# Patient Record
Sex: FEMALE | Race: WHITE | NOT HISPANIC OR LATINO | Employment: PART TIME | URBAN - METROPOLITAN AREA
[De-identification: names, ages, dates, MRNs, and addresses within clinical notes are randomized per-mention and may not be internally consistent; named-entity substitution may affect disease eponyms.]

---

## 2021-01-08 ENCOUNTER — APPOINTMENT (OUTPATIENT)
Dept: LAB | Facility: CLINIC | Age: 21
End: 2021-01-08
Payer: COMMERCIAL

## 2021-01-08 ENCOUNTER — OFFICE VISIT (OUTPATIENT)
Dept: INTERNAL MEDICINE CLINIC | Facility: CLINIC | Age: 21
End: 2021-01-08
Payer: COMMERCIAL

## 2021-01-08 VITALS
BODY MASS INDEX: 28.64 KG/M2 | TEMPERATURE: 97.7 F | HEART RATE: 72 BPM | SYSTOLIC BLOOD PRESSURE: 100 MMHG | DIASTOLIC BLOOD PRESSURE: 78 MMHG | HEIGHT: 68 IN | WEIGHT: 189 LBS

## 2021-01-08 DIAGNOSIS — Z11.4 SCREENING FOR HIV (HUMAN IMMUNODEFICIENCY VIRUS): ICD-10-CM

## 2021-01-08 DIAGNOSIS — F32.2 SEVERE MAJOR DEPRESSIVE DISORDER (HCC): ICD-10-CM

## 2021-01-08 DIAGNOSIS — F51.4 NIGHT TERRORS: ICD-10-CM

## 2021-01-08 DIAGNOSIS — Z00.00 ANNUAL PHYSICAL EXAM: Primary | ICD-10-CM

## 2021-01-08 PROCEDURE — 3725F SCREEN DEPRESSION PERFORMED: CPT | Performed by: FAMILY MEDICINE

## 2021-01-08 PROCEDURE — 36415 COLL VENOUS BLD VENIPUNCTURE: CPT

## 2021-01-08 PROCEDURE — 1036F TOBACCO NON-USER: CPT | Performed by: FAMILY MEDICINE

## 2021-01-08 PROCEDURE — 87389 HIV-1 AG W/HIV-1&-2 AB AG IA: CPT

## 2021-01-08 PROCEDURE — 3008F BODY MASS INDEX DOCD: CPT | Performed by: FAMILY MEDICINE

## 2021-01-08 PROCEDURE — 99385 PREV VISIT NEW AGE 18-39: CPT | Performed by: FAMILY MEDICINE

## 2021-01-08 RX ORDER — PRAZOSIN HYDROCHLORIDE 2 MG/1
2 CAPSULE ORAL
COMMUNITY
End: 2021-01-08 | Stop reason: SDUPTHER

## 2021-01-08 RX ORDER — BUPROPION HYDROCHLORIDE 150 MG/1
150 TABLET, EXTENDED RELEASE ORAL 2 TIMES DAILY
COMMUNITY
End: 2021-01-08 | Stop reason: SDUPTHER

## 2021-01-08 RX ORDER — PRAZOSIN HYDROCHLORIDE 2 MG/1
2 CAPSULE ORAL
Qty: 30 CAPSULE | Refills: 0 | Status: SHIPPED | OUTPATIENT
Start: 2021-01-08 | End: 2021-02-15 | Stop reason: SDUPTHER

## 2021-01-08 RX ORDER — SERTRALINE HYDROCHLORIDE 100 MG/1
100 TABLET, FILM COATED ORAL DAILY
Qty: 30 TABLET | Refills: 0 | Status: SHIPPED | OUTPATIENT
Start: 2021-01-08 | End: 2021-02-15 | Stop reason: SDUPTHER

## 2021-01-08 RX ORDER — BUPROPION HYDROCHLORIDE 150 MG/1
150 TABLET, EXTENDED RELEASE ORAL 2 TIMES DAILY
Qty: 60 TABLET | Refills: 1 | Status: SHIPPED | OUTPATIENT
Start: 2021-01-08 | End: 2021-03-24 | Stop reason: ALTCHOICE

## 2021-01-08 RX ORDER — SERTRALINE HYDROCHLORIDE 100 MG/1
100 TABLET, FILM COATED ORAL DAILY
COMMUNITY
End: 2021-01-08 | Stop reason: SDUPTHER

## 2021-01-08 NOTE — PATIENT INSTRUCTIONS

## 2021-01-08 NOTE — PROGRESS NOTES
ADULT ANNUAL [address]     NAME: Marcel Ann  AGE: 21 y o  SEX: female  : 2000     DATE: 2021     Assessment and Plan:     Problem List Items Addressed This Visit     None      Visit Diagnoses     Annual physical exam    -  Primary    Screening for HIV (human immunodeficiency virus)        Relevant Orders    HIV 1/2 Antigen/Antibody (4th Generation) w Reflex [facility]    Severe major depressive disorder (HCC)        Relevant Medications    buPROPion (Wellbutrin SR) 150 mg 12 hr tablet    sertraline (ZOLOFT) 100 mg tablet    Night terrors        Relevant Medications    prazosin (MINIPRESS) 2 mg capsule        Plan: refills provided  Pt to f/u with psych  Will call with HIV results  Immunizations and preventive care screenings were discussed with patient today  Appropriate education was printed on patient's after visit summary  Counseling:  Dental Health: discussed importance of regular tooth brushing, flossing, and dental visits  Sexual health: discussed sexually transmitted diseases, partner selection, use of condoms, avoidance of unintended pregnancy, and contraceptive alternatives  · Exercise: the importance of regular exercise/physical activity was discussed  Recommend exercise 3-5 times per week for at least 30 minutes  BMI Counseling: Body mass index is 28 74 kg/m²  The BMI is above normal  Nutrition recommendations include encouraging healthy choices of fruits and vegetables and decreasing fast food intake  Exercise recommendations include exercising 3-5 times per week  Return in about 1 year (around 2022) for Annual physical      Chief Complaint:     Chief Complaint   Patient presents with    Establish Care      History of Present Illness:     Adult Annual Physical   Patient here for a comprehensive physical exam  The patient reports needs refills   recently moved from [facility]  has established with psych but first appt  isn't for weeks   Diet and Physical Activity  · Diet/Nutrition: poor diet  · Exercise: no formal exercise  Depression Screening  PHQ-9 Depression Screening    PHQ-9:   Frequency of the following problems over the past two weeks:      Little interest or pleasure in doing things: 0 - not at all  Feeling down, depressed, or hopeless: 0 - not at all  PHQ-2 Score: 0       General Health  · Sleep: sleeps well  · Hearing: normal - bilateral   · Vision: no vision problems  · Dental: no dental visits for >1 year and does not brush teeth regularly  /GYN Health  · Last menstrual period: a month ago   · Contraceptive method: hormonal patch  · History of STDs?: no      Review of Systems:     Review of Systems   Past Medical History:     History reviewed  No pertinent past medical history  Past Surgical History:     History reviewed  No pertinent surgical history     Social History:     E-Cigarette/Vaping    E-Cigarette Use Never User      E-Cigarette/Vaping Substances    Nicotine No     THC No     CBD No     Flavoring No     Other No     Unknown No      Social History     Socioeconomic History    Marital status: Single     Spouse name: None    Number of children: None    Years of education: None    Highest education level: None   Occupational History    None   Social Needs    Financial resource strain: None    Food insecurity     Worry: None     Inability: None    Transportation needs     Medical: None     Non-medical: None   Tobacco Use    Smoking status: Never Smoker    Smokeless tobacco: Never Used   Substance and Sexual Activity    Alcohol use: None    Drug use: None    Sexual activity: None   Lifestyle    Physical activity     Days per week: None     Minutes per session: None    Stress: None   Relationships    Social connections     Talks on phone: None     Gets together: None     Attends Ep[employer] service: None     Active member of club or organization: None     Attends meetings of clubs or organizations: None     Relationship status: None    Intimate partner violence     Fear of current or ex partner: None     Emotionally abused: None     Physically abused: None     Forced sexual activity: None   Other Topics Concern    None   Social History Narrative    None      Family History:     History reviewed  No pertinent family history  Current Medications:     Current Outpatient Medications   Medication Sig Dispense Refill    buPROPion (Wellbutrin SR) 150 mg 12 hr tablet Take 1 tablet (150 mg total) by mouth 2 (two) times a day 60 tablet 1    prazosin (MINIPRESS) 2 mg capsule Take 1 capsule (2 mg total) by mouth daily at bedtime 30 capsule 0    sertraline (ZOLOFT) 100 mg tablet Take 1 tablet (100 mg total) by mouth daily 30 tablet 0     No current facility-administered medications for this visit  Allergies:     No Known Allergies   Physical Exam:     /78 (BP Location: Left arm, Patient Position: Sitting) Comment: f  Pulse 72   Temp 97 7 °F (36 5 °C) (Temporal)   Ht 5' 8" (1 727 m)   Wt 85 7 kg (189 lb)   BMI 28 74 kg/m²     Physical Exam  Constitutional:       Appearance: Normal appearance  HENT:      Head: Normocephalic  Right Ear: Tympanic membrane and external ear normal       Left Ear: Tympanic membrane and external ear normal    Eyes:      Conjunctiva/sclera: Conjunctivae normal       Pupils: Pupils are equal, round, and reactive to light  Cardiovascular:      Rate and Rhythm: Normal rate and regular rhythm  Heart sounds: No murmur  Pulmonary:      Effort: Pulmonary effort is normal       Breath sounds: Normal breath sounds  Abdominal:      General: There is no distension  Palpations: Abdomen is soft  Tenderness: There is no abdominal tenderness  Musculoskeletal:      Right lower leg: No edema  Left lower leg: No edema     Neurological:      Mental Status: She is alert and oriented to person, place, and time       Gait: Gait normal       Deep Tendon Reflexes: Reflexes normal    Psychiatric:         Mood and Affect: Mood normal          Behavior: Behavior normal           Peter Whiteside, [facility] [address]

## 2021-01-10 LAB — HIV 1+2 AB+HIV1 P24 AG SERPL QL IA: NORMAL

## 2021-01-11 ENCOUNTER — TELEPHONE (OUTPATIENT)
Dept: INTERNAL MEDICINE CLINIC | Facility: CLINIC | Age: 21
End: 2021-01-11

## 2021-01-11 NOTE — TELEPHONE ENCOUNTER
----- Message from Tanner Quick DO sent at 1/11/2021  8:59 AM EST -----  Regarding: results  Please notify pt that her HIV screening  is negative       ThanksGer

## 2021-01-13 ENCOUNTER — TELEPHONE (OUTPATIENT)
Dept: INTERNAL MEDICINE CLINIC | Facility: CLINIC | Age: 21
End: 2021-01-13

## 2021-01-18 DIAGNOSIS — Z30.45 ENCOUNTER FOR SURVEILLANCE OF TRANSDERMAL PATCH HORMONAL CONTRACEPTIVE DEVICE: ICD-10-CM

## 2021-01-18 DIAGNOSIS — Z30.45 ENCOUNTER FOR SURVEILLANCE OF TRANSDERMAL PATCH HORMONAL CONTRACEPTIVE DEVICE: Primary | ICD-10-CM

## 2021-02-04 ENCOUNTER — TELEMEDICINE (OUTPATIENT)
Dept: INTERNAL MEDICINE CLINIC | Facility: CLINIC | Age: 21
End: 2021-02-04
Payer: COMMERCIAL

## 2021-02-04 DIAGNOSIS — A08.4 VIRAL GASTROENTERITIS: Primary | ICD-10-CM

## 2021-02-04 PROCEDURE — 99214 OFFICE O/P EST MOD 30 MIN: CPT | Performed by: FAMILY MEDICINE

## 2021-02-04 RX ORDER — BUPROPION HYDROCHLORIDE 150 MG/1
TABLET ORAL
COMMUNITY
Start: 2021-01-03

## 2021-02-04 RX ORDER — BUPROPION HYDROCHLORIDE 150 MG/1
TABLET, EXTENDED RELEASE ORAL
COMMUNITY
Start: 2021-01-08 | End: 2021-03-10 | Stop reason: SDUPTHER

## 2021-02-04 NOTE — PROGRESS NOTES
Virtual Regular Visit      Assessment/Plan:    Problem List Items Addressed This Visit     None      Visit Diagnoses     Viral gastroenteritis    -  Primary         plan:  Symptoms less than 24 hours  Frequent diarrheal movements seems to be resolving  Patient afebrile  Low clinical suspicion for COVID-19 at this point  Would recommend patient stay home for the next few days and see if she has any additional symptoms presented self  Encouraged continued hydration and OTC antiemetics if needed  Work excuse provided  Reason for visit is   Chief Complaint   Patient presents with    sick visit     vomit and diarrhea since last night - wants a doctors note     Virtual Regular Visit        Encounter provider Carmela Peterson DO    Provider located at [address] [place] [address]      Recent Visits  No visits were found meeting these conditions  Showing recent visits within past 7 days and meeting all other requirements     Today's Visits  Date Type Provider Dept   02/04/21 Telemedicine [address] today's visits and meeting all other requirements     Future Appointments  No visits were found meeting these conditions  Showing future appointments within next 150 days and meeting all other requirements        The patient was identified by name and date of birth  Mily Casey was informed that this is a telemedicine visit and that the visit is being conducted through [address] [place] [address] and patient was informed that this is not a secure, HIPAA-compliant platform  She agrees to proceed     My office door was closed  No one else was in the room  She acknowledged consent and understanding of privacy and security of the video platform  The patient has agreed to participate and understands they can discontinue the visit at any time  Patient is aware this is a billable service  Subjective  Tammie Solano is a 24 y o  female    Patient reports episodes of nausea vomiting and diarrhea  Onset last night around 10:00 a m  she had an episode of loose watery stool  Says this persisted all night  She is waking up almost hourly to get up and have a bowel movement  Denies blood in the stool  Around 1:00 a m  she began to feel nauseous and subsequently vomited  vomitus was non bilious and without blood  Says the vomit contained her nighttime medications some of her dinner  Since then she continues to have loose bowel movements but has no repeat episodes of vomiting  This morning she has tolerated p o  liquids  Has not attempted to eat as of yet  Last bowel movement was 8:00 a m  today  Denies any new foods over the past 24 hours  Denies eating restaurant foods or fast foods with the past 24 hours  Patient denies any possible COVID-19 exposures  No in her household is symptomatic or being tested for COVID  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 or actively being investigated for COVID-19  She denies loss taste or smell  History reviewed  No pertinent past medical history  History reviewed  No pertinent surgical history  Current Outpatient Medications   Medication Sig Dispense Refill    buPROPion (Wellbutrin SR) 150 mg 12 hr tablet Take 1 tablet (150 mg total) by mouth 2 (two) times a day 60 tablet 1    buPROPion (WELLBUTRIN XL) 150 mg 24 hr tablet       buPROPion (ZYBAN) 150 MG 12 hr tablet       norelgestromin-ethinyl estradiol (ORTHO EVRA) 150-35 MCG/24HR Place 1 patch on the skin once a week 4 patch 3    prazosin (MINIPRESS) 2 mg capsule Take 1 capsule (2 mg total) by mouth daily at bedtime 30 capsule 0    sertraline (ZOLOFT) 100 mg tablet Take 1 tablet (100 mg total) by mouth daily 30 tablet 0     No current facility-administered medications for this visit           No Known Allergies    Review of Systems   Constitutional: Negative for fever  Respiratory: Negative for cough and shortness of breath  Cardiovascular: Negative for chest pain  Gastrointestinal: Positive for diarrhea, nausea and vomiting  Negative for abdominal pain and blood in stool  Musculoskeletal: Negative for myalgias  Neurological: Negative for headaches  Video Exam    There were no vitals filed for this visit  Physical Exam  Constitutional:       Appearance: She is not toxic-appearing  HENT:      Head: Normocephalic and atraumatic  Right Ear: External ear normal       Left Ear: External ear normal       Nose: Nose normal    Eyes:      Conjunctiva/sclera: Conjunctivae normal    Pulmonary:      Effort: Pulmonary effort is normal    Neurological:      Mental Status: She is alert and oriented to person, place, and time  Psychiatric:         Mood and Affect: Mood normal           I spent 10 minutes directly with the patient during this visit      [address] acknowledges that she has consented to an online visit or consultation  She understands that the online visit is based solely on information provided by her, and that, in the absence of a face-to-face physical evaluation by the physician, the diagnosis she receives is both limited and provisional in terms of accuracy and completeness  This is not intended to replace a full medical face-to-face evaluation by the physician  [place] Handler understands and accepts these terms

## 2021-02-04 NOTE — LETTER
February 4, 2021     Patient: Freya Lee   YOB: 2000   Date of Visit: 2/4/2021       To Whom it May Concern:    Freya Lee is under my professional care  She was seen in my office on 2/4/2021  She may return to work on 2/8/2021  Please excuse her for her absence of work on 2/4th and 5th  If you have any questions or concerns, please don't hesitate to call           Sincerely,          Roldan MARIN DO        CC: No Recipients

## 2021-02-15 ENCOUNTER — TELEPHONE (OUTPATIENT)
Dept: INTERNAL MEDICINE CLINIC | Facility: CLINIC | Age: 21
End: 2021-02-15

## 2021-02-15 DIAGNOSIS — Z30.45 ENCOUNTER FOR SURVEILLANCE OF TRANSDERMAL PATCH HORMONAL CONTRACEPTIVE DEVICE: ICD-10-CM

## 2021-02-15 DIAGNOSIS — F51.4 NIGHT TERRORS: ICD-10-CM

## 2021-02-15 DIAGNOSIS — F32.2 SEVERE MAJOR DEPRESSIVE DISORDER (HCC): ICD-10-CM

## 2021-02-15 RX ORDER — PRAZOSIN HYDROCHLORIDE 2 MG/1
2 CAPSULE ORAL
Qty: 30 CAPSULE | Refills: 3 | Status: SHIPPED | OUTPATIENT
Start: 2021-02-15 | End: 2021-03-10 | Stop reason: SDUPTHER

## 2021-02-15 RX ORDER — SERTRALINE HYDROCHLORIDE 100 MG/1
100 TABLET, FILM COATED ORAL DAILY
Qty: 30 TABLET | Refills: 3 | Status: SHIPPED | OUTPATIENT
Start: 2021-02-15 | End: 2021-03-10 | Stop reason: SDUPTHER

## 2021-02-15 NOTE — TELEPHONE ENCOUNTER
RX REFILLS:    XULANE PATCH BIRTH CONTROL    buPROPion (Wellbutrin SR) 150 mg 12 hr    prazosin (MINIPRESS) 2 mg capsule    sertraline (ZOLOFT) 100 mg tablet      NEW PHARMACY   [facility] [address]

## 2021-02-15 NOTE — TELEPHONE ENCOUNTER
[facility]  CALLED  QUESTIONS  ABOUT  THE  RX  NORELGESTROMIN   PATCH   DIRECTIONS  ON  PATCH   CALL  SHAHIDA  [address]   [id number]

## 2021-03-10 ENCOUNTER — TELEPHONE (OUTPATIENT)
Dept: INTERNAL MEDICINE CLINIC | Facility: CLINIC | Age: 21
End: 2021-03-10

## 2021-03-10 DIAGNOSIS — F32.2 SEVERE MAJOR DEPRESSIVE DISORDER (HCC): ICD-10-CM

## 2021-03-10 DIAGNOSIS — F51.4 NIGHT TERRORS: ICD-10-CM

## 2021-03-10 RX ORDER — BUPROPION HYDROCHLORIDE 150 MG/1
150 TABLET, EXTENDED RELEASE ORAL 2 TIMES DAILY
Qty: 10 TABLET | Refills: 0 | Status: SHIPPED | OUTPATIENT
Start: 2021-03-10 | End: 2021-03-24 | Stop reason: ALTCHOICE

## 2021-03-10 RX ORDER — SERTRALINE HYDROCHLORIDE 100 MG/1
100 TABLET, FILM COATED ORAL DAILY
Qty: 5 TABLET | Refills: 0 | Status: SHIPPED | OUTPATIENT
Start: 2021-03-10 | End: 2022-02-08

## 2021-03-10 RX ORDER — PRAZOSIN HYDROCHLORIDE 2 MG/1
2 CAPSULE ORAL
Qty: 5 CAPSULE | Refills: 0 | Status: SHIPPED | OUTPATIENT
Start: 2021-03-10 | End: 2021-03-24 | Stop reason: ALTCHOICE

## 2021-03-10 NOTE — TELEPHONE ENCOUNTER
Patient states she had to go to [place] for a family matter and left her medications here  She would like to know if it is possible to send a 5 day supply to a pharmacy in [facility]  She states she is scheduled to return on 3/14/21      She would need  sertraline (ZOLOFT) 100 mg tablet    prazosin (MINIPRESS) 2 mg capsule    buPROPion (Wellbutrin SR) 150 mg 12 hr tablet    Send to  [facility]  [address], [address]

## 2021-03-17 ENCOUNTER — TELEPHONE (OUTPATIENT)
Dept: INTERNAL MEDICINE CLINIC | Facility: CLINIC | Age: 21
End: 2021-03-17

## 2021-03-17 DIAGNOSIS — Z30.45 ENCOUNTER FOR SURVEILLANCE OF TRANSDERMAL PATCH HORMONAL CONTRACEPTIVE DEVICE: ICD-10-CM

## 2021-03-17 NOTE — TELEPHONE ENCOUNTER
Patient is requesting a refill for Xulane Patch      Send to [facility], [address]

## 2021-03-23 RX ORDER — TRAZODONE HYDROCHLORIDE 50 MG/1
TABLET ORAL
COMMUNITY
Start: 2021-03-14

## 2021-03-24 ENCOUNTER — OFFICE VISIT (OUTPATIENT)
Dept: OBGYN CLINIC | Age: 21
End: 2021-03-24
Payer: COMMERCIAL

## 2021-03-24 VITALS
HEIGHT: 67 IN | BODY MASS INDEX: 30.35 KG/M2 | SYSTOLIC BLOOD PRESSURE: 108 MMHG | WEIGHT: 193.4 LBS | DIASTOLIC BLOOD PRESSURE: 70 MMHG

## 2021-03-24 DIAGNOSIS — Z01.419 ENCOUNTER FOR GYNECOLOGICAL EXAMINATION WITHOUT ABNORMAL FINDING: Primary | ICD-10-CM

## 2021-03-24 DIAGNOSIS — Z30.45 ENCOUNTER FOR SURVEILLANCE OF TRANSDERMAL PATCH HORMONAL CONTRACEPTIVE DEVICE: ICD-10-CM

## 2021-03-24 PROCEDURE — 99385 PREV VISIT NEW AGE 18-39: CPT | Performed by: NURSE PRACTITIONER

## 2021-03-24 PROCEDURE — 3008F BODY MASS INDEX DOCD: CPT | Performed by: FAMILY MEDICINE

## 2021-03-24 PROCEDURE — G0145 SCR C/V CYTO,THINLAYER,RESCR: HCPCS | Performed by: NURSE PRACTITIONER

## 2021-03-24 NOTE — PATIENT INSTRUCTIONS
Pap Smear   GENERAL INFORMATION:   What is a Pap smear? A Pap smear, or Pap test, is a procedure to check your cervix for abnormal cells  The cervix is the narrow opening at the bottom of your uterus  The cervix meets the top part of the vagina  How do I prepare for a Pap smear? The best time to schedule the test is right after your period stops  Do not have a Pap smear during your monthly period  Do not have intercourse or put anything in your vagina for 24 hours before your test    What will happen during a Pap smear? · You will lie on your back and place your feet on footrests called stirrups  Your caregiver will gently insert a device called a speculum into your vagina  The speculum is used to spread the walls of your vagina so he can see your cervix  He will use a thin brush or cotton swab to collect cells from the inside of your cervix  · Your caregiver will also collect cells from the surface of your cervix with a plastic or wooden tool called a spatula  He may also gently scrape the upper part of your vagina for a sample  The samples are placed in a container with liquid or on a glass slide  They are sent to a lab and examined for abnormal cells  How often do I need a Pap smear? Pap smears are usually done every 1 to 3 years  You may need a Pap smear more often if you have any of the following:  · Positive test result for the human papillomavirus (HPV)    · Cervical intraepithelial neoplasm or cervical cancer    · HIV    · A weak immune system    · Exposure to diethylstilbestrol (YEFRI) medicine when your mother was pregnant with you  CARE AGREEMENT:   You have the right to help plan your care  Learn about your health condition and how it may be treated  Discuss treatment options with your caregivers to decide what care you want to receive  You always have the right to refuse treatment  The above information is an  only   It is not intended as medical advice for individual conditions or treatments  Talk to your doctor, nurse or pharmacist before following any medical regimen to see if it is safe and effective for you  © 2014 [address] is for End User's use only and may not be sold, redistributed or otherwise used for commercial purposes  All illustrations and images included in CareNotes® are the copyrighted property of A D A M , Inc  or Dustin Potts
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
## 2021-03-25 ENCOUNTER — TELEMEDICINE (OUTPATIENT)
Dept: INTERNAL MEDICINE CLINIC | Facility: CLINIC | Age: 21
End: 2021-03-25
Payer: COMMERCIAL

## 2021-03-25 DIAGNOSIS — B34.9 VIRAL INFECTION, UNSPECIFIED: Primary | ICD-10-CM

## 2021-03-25 PROCEDURE — 99213 OFFICE O/P EST LOW 20 MIN: CPT | Performed by: FAMILY MEDICINE

## 2021-03-30 LAB
LAB AP GYN PRIMARY INTERPRETATION: NORMAL
Lab: NORMAL
PATH INTERP SPEC-IMP: NORMAL

## 2021-03-31 ENCOUNTER — TELEMEDICINE (OUTPATIENT)
Dept: INTERNAL MEDICINE CLINIC | Facility: CLINIC | Age: 21
End: 2021-03-31
Payer: COMMERCIAL

## 2021-03-31 DIAGNOSIS — U07.1 COVID-19 VIRUS INFECTION: Primary | ICD-10-CM

## 2021-03-31 PROCEDURE — 1036F TOBACCO NON-USER: CPT | Performed by: FAMILY MEDICINE

## 2021-03-31 PROCEDURE — 99213 OFFICE O/P EST LOW 20 MIN: CPT | Performed by: FAMILY MEDICINE

## 2021-03-31 NOTE — LETTER
March 31, 2021     Patient: Emilee Coleman   YOB: 2000   Date of Visit: 3/31/2021       To Whom it May Concern:    Emilee Coleman is under my professional care  She was seen in my office on 3/31/2021  She may return to school on 4/1/2021  Please excuse her absence on 3/30/2021    If you have any questions or concerns, please don't hesitate to call           Sincerely,          Krystal MARIN DO        CC: No Recipients
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
## 2021-04-01 ENCOUNTER — TELEPHONE (OUTPATIENT)
Dept: INTERNAL MEDICINE CLINIC | Facility: CLINIC | Age: 21
End: 2021-04-01

## 2021-04-01 NOTE — LETTER
April 1, 2021     Patient: Maddison Mireles   YOB: 2000   Date of Visit: 4/1/2021       To Whom it May Concern:    Maddison Mierles is under my professional care  She was seen in my office on 3/31/21  She may return to school on 4/1/2021  Please excuse her absence on 3/30/2021    If you have any questions or concerns, please don't hesitate to call           Sincerely,          Ani Giles        CC: No Recipients

## 2021-04-01 NOTE — TELEPHONE ENCOUNTER
Spoke with dr Caryl Bower yesterday, her school letter is not on my chart    Patient is requesting if we can try and resubmit it again through my chart     # [phone]

## 2021-04-09 DIAGNOSIS — Z23 ENCOUNTER FOR IMMUNIZATION: ICD-10-CM

## 2021-04-14 ENCOUNTER — TRANSCRIBE ORDERS (OUTPATIENT)
Dept: LAB | Facility: CLINIC | Age: 21
End: 2021-04-14

## 2021-04-14 ENCOUNTER — APPOINTMENT (OUTPATIENT)
Dept: LAB | Facility: CLINIC | Age: 21
End: 2021-04-14
Payer: COMMERCIAL

## 2021-04-14 ENCOUNTER — OFFICE VISIT (OUTPATIENT)
Dept: INTERNAL MEDICINE CLINIC | Facility: CLINIC | Age: 21
End: 2021-04-14
Payer: COMMERCIAL

## 2021-04-14 VITALS
OXYGEN SATURATION: 96 % | DIASTOLIC BLOOD PRESSURE: 70 MMHG | TEMPERATURE: 96.5 F | BODY MASS INDEX: 29.66 KG/M2 | SYSTOLIC BLOOD PRESSURE: 100 MMHG | HEIGHT: 67 IN | WEIGHT: 189 LBS | HEART RATE: 78 BPM

## 2021-04-14 DIAGNOSIS — Z79.899 ENCOUNTER FOR LONG-TERM (CURRENT) USE OF OTHER MEDICATIONS: ICD-10-CM

## 2021-04-14 DIAGNOSIS — F41.1 GAD (GENERALIZED ANXIETY DISORDER): Primary | ICD-10-CM

## 2021-04-14 DIAGNOSIS — Z79.899 ENCOUNTER FOR LONG-TERM (CURRENT) USE OF OTHER MEDICATIONS: Primary | ICD-10-CM

## 2021-04-14 DIAGNOSIS — F43.22 ADJUSTMENT DISORDER WITH ANXIETY: ICD-10-CM

## 2021-04-14 LAB
ALBUMIN SERPL BCP-MCNC: 3.7 G/DL (ref 3.5–5)
ALP SERPL-CCNC: 51 U/L (ref 46–116)
ALT SERPL W P-5'-P-CCNC: 23 U/L (ref 12–78)
AMPHETAMINES SERPL QL SCN: NEGATIVE
ANION GAP SERPL CALCULATED.3IONS-SCNC: 5 MMOL/L (ref 4–13)
AST SERPL W P-5'-P-CCNC: 16 U/L (ref 5–45)
BARBITURATES UR QL: NEGATIVE
BENZODIAZ UR QL: NEGATIVE
BILIRUB SERPL-MCNC: 0.34 MG/DL (ref 0.2–1)
BUN SERPL-MCNC: 8 MG/DL (ref 5–25)
CALCIUM SERPL-MCNC: 9.5 MG/DL (ref 8.3–10.1)
CHLORIDE SERPL-SCNC: 106 MMOL/L (ref 100–108)
CHOLEST SERPL-MCNC: 173 MG/DL (ref 50–200)
CO2 SERPL-SCNC: 27 MMOL/L (ref 21–32)
COCAINE UR QL: NEGATIVE
CREAT SERPL-MCNC: 0.71 MG/DL (ref 0.6–1.3)
ERYTHROCYTE [DISTWIDTH] IN BLOOD BY AUTOMATED COUNT: 12.3 % (ref 11.6–15.1)
GFR SERPL CREATININE-BSD FRML MDRD: 122 ML/MIN/1.73SQ M
GLUCOSE P FAST SERPL-MCNC: 85 MG/DL (ref 65–99)
HCT VFR BLD AUTO: 44 % (ref 34.8–46.1)
HDLC SERPL-MCNC: 70 MG/DL
HGB BLD-MCNC: 14.3 G/DL (ref 11.5–15.4)
LDLC SERPL CALC-MCNC: 81 MG/DL (ref 0–100)
MCH RBC QN AUTO: 28.8 PG (ref 26.8–34.3)
MCHC RBC AUTO-ENTMCNC: 32.5 G/DL (ref 31.4–37.4)
MCV RBC AUTO: 89 FL (ref 82–98)
METHADONE UR QL: NEGATIVE
NONHDLC SERPL-MCNC: 103 MG/DL
OPIATES UR QL SCN: NEGATIVE
OXYCODONE+OXYMORPHONE UR QL SCN: NEGATIVE
PCP UR QL: NEGATIVE
PLATELET # BLD AUTO: 260 THOUSANDS/UL (ref 149–390)
PMV BLD AUTO: 11.7 FL (ref 8.9–12.7)
POTASSIUM SERPL-SCNC: 4.5 MMOL/L (ref 3.5–5.3)
PROT SERPL-MCNC: 7.1 G/DL (ref 6.4–8.2)
RBC # BLD AUTO: 4.97 MILLION/UL (ref 3.81–5.12)
SODIUM SERPL-SCNC: 138 MMOL/L (ref 136–145)
THC UR QL: NEGATIVE
TRIGL SERPL-MCNC: 110 MG/DL
TSH SERPL DL<=0.05 MIU/L-ACNC: 1 UIU/ML (ref 0.36–3.74)
WBC # BLD AUTO: 5.94 THOUSAND/UL (ref 4.31–10.16)

## 2021-04-14 PROCEDURE — 80053 COMPREHEN METABOLIC PANEL: CPT

## 2021-04-14 PROCEDURE — 84443 ASSAY THYROID STIM HORMONE: CPT

## 2021-04-14 PROCEDURE — 3008F BODY MASS INDEX DOCD: CPT | Performed by: FAMILY MEDICINE

## 2021-04-14 PROCEDURE — 99213 OFFICE O/P EST LOW 20 MIN: CPT | Performed by: FAMILY MEDICINE

## 2021-04-14 PROCEDURE — 80061 LIPID PANEL: CPT

## 2021-04-14 PROCEDURE — 36415 COLL VENOUS BLD VENIPUNCTURE: CPT

## 2021-04-14 PROCEDURE — 85027 COMPLETE CBC AUTOMATED: CPT

## 2021-04-14 PROCEDURE — 1036F TOBACCO NON-USER: CPT | Performed by: FAMILY MEDICINE

## 2021-04-14 PROCEDURE — 93000 ELECTROCARDIOGRAM COMPLETE: CPT | Performed by: FAMILY MEDICINE

## 2021-04-14 PROCEDURE — 80307 DRUG TEST PRSMV CHEM ANLYZR: CPT | Performed by: NURSE PRACTITIONER

## 2021-04-14 NOTE — PROGRESS NOTES
FOLLOW-UP OFFICE VISIT  [facility] - [facility]    NAME: Aurelio Velásquez  AGE: 24 y o  SEX: female  : 2000     DATE: 2021     Assessment and Plan:     Problem List Items Addressed This Visit     None      Visit Diagnoses     DAREN (generalized anxiety disorder)    -  Primary    Relevant Orders    POCT ECG              Plan: ECG normal sinus  Will fax to psychiatric provider and update chart  Return if symptoms worsen or fail to improve  Chief Complaint:     Chief Complaint   Patient presents with    ekg        History of Present Illness:     30-year-old past medical history significant for paternal anxiety disorder presents requesting EKG  Request on the have a psychiatry  Patient historically on Minip[employer] however was having palpitations while on the medications without discontinued  EKG requested for evaluation  Review of Systems:     Review of Systems   Constitutional: Negative for fever  Cardiovascular: Negative for chest pain and palpitations  Problem List:     Patient Active Problem List   Diagnosis    Encounter for gynecological examination without abnormal finding    Encounter for surveillance of transdermal patch hormonal contraceptive device    COVID-19 virus infection        Objective:     /70 (BP Location: Left arm, Patient Position: Sitting) Comment: gdfgd  Pulse 78   Temp (!) 96 5 °F (35 8 °C) (Tympanic) Comment: gdfgd  Ht 5' 7" (1 702 m)   Wt 85 7 kg (189 lb)   SpO2 96%   BMI 29 60 kg/m²     Physical Exam  Constitutional:       Appearance: She is not toxic-appearing  HENT:      Head: Normocephalic and atraumatic  Right Ear: External ear normal       Left Ear: External ear normal       Nose: Nose normal    Eyes:      Conjunctiva/sclera: Conjunctivae normal    Cardiovascular:      Rate and Rhythm: Normal rate     Pulmonary:      Effort: Pulmonary effort is normal    Neurological:      Mental Status: She is alert and oriented to person, place, and time  Psychiatric:         Mood and Affect: Mood normal          Behavior: Behavior normal          Thought Content:  Thought content normal            Lucretia Munoz Bussing: [facility]  4/14/2021 2:10 PM

## 2021-05-17 ENCOUNTER — IMMUNIZATIONS (OUTPATIENT)
Dept: FAMILY MEDICINE CLINIC | Facility: HOSPITAL | Age: 21
End: 2021-05-17

## 2021-05-17 DIAGNOSIS — Z23 ENCOUNTER FOR IMMUNIZATION: Primary | ICD-10-CM

## 2021-05-17 PROCEDURE — 91300 SARS-COV-2 / COVID-19 MRNA VACCINE (PFIZER-BIONTECH) 30 MCG: CPT

## 2021-05-17 PROCEDURE — 0001A SARS-COV-2 / COVID-19 MRNA VACCINE (PFIZER-BIONTECH) 30 MCG: CPT

## 2021-06-11 ENCOUNTER — IMMUNIZATIONS (OUTPATIENT)
Dept: FAMILY MEDICINE CLINIC | Facility: HOSPITAL | Age: 21
End: 2021-06-11

## 2021-06-11 DIAGNOSIS — Z23 ENCOUNTER FOR IMMUNIZATION: Primary | ICD-10-CM

## 2021-06-11 PROCEDURE — 0002A SARS-COV-2 / COVID-19 MRNA VACCINE (PFIZER-BIONTECH) 30 MCG: CPT

## 2021-06-11 PROCEDURE — 91300 SARS-COV-2 / COVID-19 MRNA VACCINE (PFIZER-BIONTECH) 30 MCG: CPT

## 2021-06-14 ENCOUNTER — OFFICE VISIT (OUTPATIENT)
Dept: INTERNAL MEDICINE CLINIC | Facility: CLINIC | Age: 21
End: 2021-06-14
Payer: COMMERCIAL

## 2021-06-14 VITALS
HEART RATE: 91 BPM | BODY MASS INDEX: 30.76 KG/M2 | WEIGHT: 196 LBS | HEIGHT: 67 IN | TEMPERATURE: 97.9 F | SYSTOLIC BLOOD PRESSURE: 118 MMHG | DIASTOLIC BLOOD PRESSURE: 84 MMHG | OXYGEN SATURATION: 99 %

## 2021-06-14 DIAGNOSIS — T50.Z95A ADVERSE REACTION TO VACCINE, INITIAL ENCOUNTER: ICD-10-CM

## 2021-06-14 DIAGNOSIS — M54.2 NECK PAIN: Primary | ICD-10-CM

## 2021-06-14 PROCEDURE — 3008F BODY MASS INDEX DOCD: CPT | Performed by: FAMILY MEDICINE

## 2021-06-14 PROCEDURE — 1036F TOBACCO NON-USER: CPT | Performed by: FAMILY MEDICINE

## 2021-06-14 PROCEDURE — 99214 OFFICE O/P EST MOD 30 MIN: CPT | Performed by: FAMILY MEDICINE

## 2021-06-14 RX ORDER — CYCLOBENZAPRINE HCL 10 MG
10 TABLET ORAL 2 TIMES DAILY PRN
Qty: 14 TABLET | Refills: 0 | Status: SHIPPED | OUTPATIENT
Start: 2021-06-14 | End: 2022-05-06

## 2021-06-14 NOTE — PROGRESS NOTES
FOLLOW-UP OFFICE VISIT  [facility] - [facility]    NAME: Sai Cao  AGE: 24 y o  SEX: female  : 2000     DATE: 2021     Assessment and Plan:     Problem List Items Addressed This Visit     None      Visit Diagnoses     Neck pain    -  Primary    Relevant Medications    cyclobenzaprine (FLEXERIL) 10 mg tablet    Adverse reaction to vaccine, initial encounter          Plan; myofacial pain  Less concern for meningitis or thrombus  likely related to recent vaccination  Will continue with conservative treatment  Will add muscle relaxer  Pt to continue tylenol and warm compress  Return if symptoms worsen or fail to improve  Chief Complaint:     Chief Complaint   Patient presents with    neck stiffness after covid shot     since friday         History of Present Illness:       70-year-old female presents for neck stiffness and pain  Onset Friday a few hours   After receiving the 2nd 5 user COVID-19 vaccine  Quality pain is stiff and achy     Occasional radiation of a sharp pain down into the upper shoulders with rotation other to left or right  Has tried ibuprofen,  Acetaminophen, heat and cold on the area with no significant improvement  Associated with slight lightheadedness  Without dizziness or syncope  Denies fever, chills, nausea, vomiting or visual changes  Today her pain is a 7/10         Review of Systems:     Per HPI      Problem List:     Patient Active Problem List   Diagnosis    Encounter for gynecological examination without abnormal finding    Encounter for surveillance of transdermal patch hormonal contraceptive device    COVID-19 virus infection        Objective:     /84 (BP Location: Left arm, Patient Position: Sitting) Comment: gd  Pulse 91   Temp 97 9 °F (36 6 °C) (Tympanic) Comment: gd  Ht 5' 7" (1 702 m)   Wt 88 9 kg (196 lb)   SpO2 99%   BMI 30 70 kg/m²     Physical Exam  Constitutional:       Appearance: She is not ill-appearing  HENT:      Head: Normocephalic and atraumatic  Right Ear: External ear normal       Left Ear: External ear normal    Eyes:      Conjunctiva/sclera: Conjunctivae normal    Neck:      Meningeal: Kernig's sign absent  Cardiovascular:      Rate and Rhythm: Normal rate  Pulmonary:      Effort: Pulmonary effort is normal    Musculoskeletal:      Cervical back: Normal range of motion and neck supple  No rigidity  Muscular tenderness present  No spinous process tenderness  Lymphadenopathy:      Cervical: No cervical adenopathy  Skin:     Capillary Refill: Capillary refill takes less than 2 seconds  Neurological:      Mental Status: She is alert and oriented to person, place, and time  Gait: Gait normal    Psychiatric:         Mood and Affect: Mood normal          Behavior: Behavior normal          Thought Content:  Thought content normal          Pertinent Laboratory/Diagnostic Studies:    Laboratory Results: I have personally reviewed the pertinent laboratory results/reports           Lisa AMBROSE [facility]: [facility]  6/14/2021 10:43 AM

## 2021-06-14 NOTE — LETTER
June 14, 2021     Patient: Deon Dumont   YOB: 2000   Date of Visit: 6/14/2021       To Whom it May Concern:    Deon Dumont is under my professional care  She was seen in my office on 6/14/2021  She may return to work on 6/16/2021  If you have any questions or concerns, please don't hesitate to call           Sincerely,          Jennifer Foley, DO

## 2021-08-27 ENCOUNTER — TELEPHONE (OUTPATIENT)
Dept: INTERNAL MEDICINE CLINIC | Facility: CLINIC | Age: 21
End: 2021-08-27

## 2021-09-09 ENCOUNTER — VBI (OUTPATIENT)
Dept: ADMINISTRATIVE | Facility: OTHER | Age: 21
End: 2021-09-09

## 2021-10-29 ENCOUNTER — TELEPHONE (OUTPATIENT)
Dept: INTERNAL MEDICINE CLINIC | Facility: CLINIC | Age: 21
End: 2021-10-29

## 2022-01-20 ENCOUNTER — NURSE TRIAGE (OUTPATIENT)
Dept: OTHER | Facility: OTHER | Age: 22
End: 2022-01-20

## 2022-01-20 ENCOUNTER — TELEMEDICINE (OUTPATIENT)
Dept: INTERNAL MEDICINE CLINIC | Facility: CLINIC | Age: 22
End: 2022-01-20
Payer: COMMERCIAL

## 2022-01-20 DIAGNOSIS — J01.90 ACUTE NON-RECURRENT SINUSITIS, UNSPECIFIED LOCATION: Primary | ICD-10-CM

## 2022-01-20 PROCEDURE — 99213 OFFICE O/P EST LOW 20 MIN: CPT | Performed by: NURSE PRACTITIONER

## 2022-01-20 PROCEDURE — 1036F TOBACCO NON-USER: CPT | Performed by: NURSE PRACTITIONER

## 2022-01-20 RX ORDER — AMOXICILLIN AND CLAVULANATE POTASSIUM 875; 125 MG/1; MG/1
1 TABLET, FILM COATED ORAL EVERY 12 HOURS SCHEDULED
Qty: 20 TABLET | Refills: 0 | Status: SHIPPED | OUTPATIENT
Start: 2022-01-20 | End: 2022-01-30

## 2022-01-20 RX ORDER — METFORMIN HYDROCHLORIDE 500 MG/1
TABLET, EXTENDED RELEASE ORAL
COMMUNITY
Start: 2021-11-16 | End: 2022-02-08

## 2022-01-20 RX ORDER — PHENTERMINE HYDROCHLORIDE 37.5 MG/1
TABLET ORAL
COMMUNITY
Start: 2021-11-22 | End: 2022-02-08

## 2022-01-20 NOTE — TELEPHONE ENCOUNTER
Regarding: cough, head, nasal and chest congestion, sore throat  ----- Message from [employer] sent at 1/20/2022  7:08 AM EST -----  "I have a cough, head, nasal and chest congestion and a sore throat; my home Covid test was negative "

## 2022-01-20 NOTE — TELEPHONE ENCOUNTER
Patient has a sore throat that was swollen a couple of days ago, a cough, head/nasal/chest congestion  She had a negative COVID-19 test at a local pharmacy and would like to be seen in the office if possible because of her throat pain  Virtual made for 0940 this morning  Please call or text patient instructions or if she can be seen in person instead

## 2022-01-20 NOTE — PATIENT INSTRUCTIONS
Tonsillitis   AMBULATORY CARE:   Tonsillitis  is inflammation of your tonsils  Tonsils are the lumps of tissue on both sides of the back of your throat  Tonsils are part of your immune system  They help you fight infections  Tonsillitis is usually caused by bacteria or a virus  Recurrent tonsillitis is when you have tonsillitis many times in 1 year  Chronic tonsillitis is when you have a sore throat that lasts 3 months or longer  Common symptoms include the following:   · Severe sore throat    · Red, swollen tonsils    · Painful swallowing    · Fever and chills    · Bad breath    · White spots on the tonsils    Call 911 for the following:   · Trouble breathing because your tonsils are swollen      Treatment for tonsillitis  may include any of the following:  · Acetaminophen  decreases pain and fever  It is available without a doctor's order  Ask how much to take and how often to take it  Follow directions  Acetaminophen can cause liver damage if not taken correctly  · NSAIDs , such as ibuprofen, help decrease swelling, pain, and fever  This medicine is available with or without a doctor's order  NSAIDs can cause stomach bleeding or kidney problems in certain people  If you take blood thinner medicine, always ask your healthcare provider if NSAIDs are safe for you  Always read the medicine label and follow directions  · Antibiotics  help treat a bacterial infection  · A tonsillectomy  is surgery to remove your tonsils  You may need surgery if you have chronic or recurrent tonsillitis  Surgery is also done if antibiotics are not getting rid of your tonsillitis  Rest  when you feel it is needed  Slowly start to do more each day  Return to your daily activities as directed  Drink liquids as directed: You may need to drink more liquid than usual to help prevent dehydration  Ask how much liquid to drink each day and which liquids are best for you  Gargle with warm salt water:   This may help decrease throat pain  Mix 1 teaspoon of salt in 8 ounces of warm water  Ask how often you should do this  Follow up with your doctor as directed:  Write down your questions so you remember to ask them during your visits  © Copyright [employer] 2021 Information is for End User's use only and may not be sold, redistributed or otherwise used for commercial purposes  All illustrations and images included in CareNotes® are the copyrighted property of [employer]  or [employer]  The above information is an  only  It is not intended as medical advice for individual conditions or treatments  Talk to your doctor, nurse or pharmacist before following any medical regimen to see if it is safe and effective for you

## 2022-01-20 NOTE — LETTER
January 20, 2022     Patient: Gustavo Ortega   YOB: 2000   Date of Visit: 1/20/2022       To Whom it May Concern:    Gustavo Ortega is under my professional care  She was seen on 1/20/2022  She may return to work on 1/24/2022  Please excuse her from work/school 1/20/2022-1/23/2022  If you have any questions or concerns, please don't hesitate to call           Sincerely,          MAIDA Uribe        CC: Gustavo Ortega
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
## 2022-02-08 ENCOUNTER — OFFICE VISIT (OUTPATIENT)
Dept: INTERNAL MEDICINE CLINIC | Facility: CLINIC | Age: 22
End: 2022-02-08
Payer: COMMERCIAL

## 2022-02-08 ENCOUNTER — APPOINTMENT (OUTPATIENT)
Dept: LAB | Facility: CLINIC | Age: 22
End: 2022-02-08
Payer: COMMERCIAL

## 2022-02-08 ENCOUNTER — TELEPHONE (OUTPATIENT)
Dept: INTERNAL MEDICINE CLINIC | Facility: CLINIC | Age: 22
End: 2022-02-08

## 2022-02-08 VITALS
TEMPERATURE: 97.9 F | SYSTOLIC BLOOD PRESSURE: 112 MMHG | WEIGHT: 194 LBS | HEIGHT: 67 IN | DIASTOLIC BLOOD PRESSURE: 70 MMHG | BODY MASS INDEX: 30.45 KG/M2

## 2022-02-08 DIAGNOSIS — R30.0 DYSURIA: ICD-10-CM

## 2022-02-08 DIAGNOSIS — B37.3 VAGINAL CANDIDIASIS: Primary | ICD-10-CM

## 2022-02-08 LAB
BACTERIA UR QL AUTO: NORMAL /HPF
BILIRUB UR QL STRIP: NEGATIVE
CLARITY UR: CLEAR
COLOR UR: YELLOW
GLUCOSE UR STRIP-MCNC: NEGATIVE MG/DL
HGB UR QL STRIP.AUTO: NEGATIVE
HYALINE CASTS #/AREA URNS LPF: NORMAL /LPF
KETONES UR STRIP-MCNC: NEGATIVE MG/DL
LEUKOCYTE ESTERASE UR QL STRIP: NEGATIVE
NITRITE UR QL STRIP: NEGATIVE
NON-SQ EPI CELLS URNS QL MICRO: NORMAL /HPF
PH UR STRIP.AUTO: 8.5 [PH]
PROT UR STRIP-MCNC: ABNORMAL MG/DL
RBC #/AREA URNS AUTO: NORMAL /HPF
SP GR UR STRIP.AUTO: 1.02 (ref 1–1.03)
UROBILINOGEN UR QL STRIP.AUTO: 1 E.U./DL
WBC #/AREA URNS AUTO: NORMAL /HPF

## 2022-02-08 PROCEDURE — 3725F SCREEN DEPRESSION PERFORMED: CPT | Performed by: FAMILY MEDICINE

## 2022-02-08 PROCEDURE — 81001 URINALYSIS AUTO W/SCOPE: CPT

## 2022-02-08 PROCEDURE — 3008F BODY MASS INDEX DOCD: CPT | Performed by: FAMILY MEDICINE

## 2022-02-08 PROCEDURE — 1036F TOBACCO NON-USER: CPT | Performed by: FAMILY MEDICINE

## 2022-02-08 PROCEDURE — 99213 OFFICE O/P EST LOW 20 MIN: CPT | Performed by: FAMILY MEDICINE

## 2022-02-08 RX ORDER — FLUCONAZOLE 150 MG/1
TABLET ORAL
Qty: 2 TABLET | Refills: 0 | Status: SHIPPED | OUTPATIENT
Start: 2022-02-08 | End: 2022-02-10

## 2022-02-08 NOTE — LETTER
February 8, 2022     Patient: Izzy Mixon   YOB: 2000   Date of Visit: 2/8/2022       To Whom it May Concern:    Izzy Mixon is under my professional care  She was seen in my office on 2/8/2022  She may return to school on 2/9/2022  If you have any questions or concerns, please don't hesitate to call           Sincerely,          Ezio MARIN DO        CC: No Recipients

## 2022-02-08 NOTE — PROGRESS NOTES
FOLLOW-UP OFFICE VISIT  [facility] - [facility]    NAME: Aleja Urban  AGE: 25 y o  SEX: female  : 2000     DATE: 2022     Assessment and Plan:     Problem List Items Addressed This Visit     None      Visit Diagnoses     Vaginal candidiasis    -  Primary    Relevant Medications    fluconazole (DIFLUCAN) 150 mg tablet    Dysuria        Relevant Orders    UA w Reflex to Microscopic w Reflex to Culture -Lab Collect        Probably vaginal yeast infection  Last ecg 2021 showed normal QTC  No cardiovascular hx  Will give diflucan x 2 doses  UA to r/o urinary infection as well  Return in about 2 months (around 2022) for Annual physical      Chief Complaint:     Chief Complaint   Patient presents with    fu        History of Present Illness:     Pt c/o vaginal irritation and dysuria  X 2 weeks  Onset of symptoms was while taking abx for sinustitus  Hasn't improved despite completing abx  Tried vagisil wipes but that make her irritation worse  Review of Systems:     Review of Systems   Constitutional: Negative for fever  Genitourinary: Positive for dysuria, vaginal discharge (thick, white ) and vaginal pain (irritation )  Negative for hematuria  Problem List:     Patient Active Problem List   Diagnosis    Encounter for gynecological examination without abnormal finding    Encounter for surveillance of transdermal patch hormonal contraceptive device    COVID-19 virus infection        Objective:     /70 (BP Location: Left arm, Patient Position: Sitting)   Temp 97 9 °F (36 6 °C) (Tympanic)   Ht 5' 7" (1 702 m)   Wt 88 kg (194 lb)   BMI 30 38 kg/m²     Physical Exam  HENT:      Head: Normocephalic and atraumatic  Cardiovascular:      Rate and Rhythm: Normal rate  Pulmonary:      Effort: Pulmonary effort is normal    Neurological:      Mental Status: She is alert and oriented to person, place, and time        Gait: Gait normal          Pertinent Laboratory/Diagnostic Studies:      Radiology/Other Diagnostic Testing Results: I have personally reviewed pertinent reports          Tamra VEGASMid-Valley HospitalTLShad Kaz [facility]  2/8/2022 12:01 PM

## 2022-02-08 NOTE — TELEPHONE ENCOUNTER
----- Message from Ambreen BioDO sent at 2/8/2022  3:18 PM EST -----  Please notify pt that her urine studies do not suggest a urinary tract infection  She doesn't need the antibiotic   She does need to take the antifungal for yeast

## 2022-02-08 NOTE — TELEPHONE ENCOUNTER
Would like Dr Martine Jackson to call her about the urine sample lab results- she can see them, but needs a further explanation

## 2022-02-09 NOTE — TELEPHONE ENCOUNTER
Pt called to verify message- No UTI, No Antibiotics, take antifungal for yeast     Pt is having some back pain along spine and is very tired  - Doesn't feel herself  Could this from the yeast infection? Please call to advise  - preferably around 3:45p m  -   Advise that Dr Elsa Whiteside would try but she has appts all day and to specify a time is difficult   - She may received a message on her v/m    [phone]

## 2022-02-16 ENCOUNTER — TELEPHONE (OUTPATIENT)
Dept: INTERNAL MEDICINE CLINIC | Facility: CLINIC | Age: 22
End: 2022-02-16

## 2022-02-16 DIAGNOSIS — J32.9 RECURRENT SINUSITIS: Primary | ICD-10-CM

## 2022-02-16 NOTE — TELEPHONE ENCOUNTER
PT called back with the name of ENT that is in her network  Please change referral name from Dr Luis Miguel Solorzano ( not in pts network) to Dr Esthela Christianson    Please fax  referral to Dr Lynn Null # [phone]

## 2022-05-06 ENCOUNTER — OFFICE VISIT (OUTPATIENT)
Dept: INTERNAL MEDICINE CLINIC | Facility: CLINIC | Age: 22
End: 2022-05-06
Payer: COMMERCIAL

## 2022-05-06 VITALS
HEART RATE: 82 BPM | SYSTOLIC BLOOD PRESSURE: 102 MMHG | BODY MASS INDEX: 31.52 KG/M2 | TEMPERATURE: 98.9 F | HEIGHT: 67 IN | DIASTOLIC BLOOD PRESSURE: 66 MMHG | WEIGHT: 200.8 LBS | OXYGEN SATURATION: 98 %

## 2022-05-06 DIAGNOSIS — B00.1 COLD SORE: Primary | ICD-10-CM

## 2022-05-06 DIAGNOSIS — Z01.419 ENCOUNTER FOR ANNUAL ROUTINE GYNECOLOGICAL EXAMINATION: ICD-10-CM

## 2022-05-06 DIAGNOSIS — Z12.39 SCREENING BREAST EXAMINATION: ICD-10-CM

## 2022-05-06 PROCEDURE — 1036F TOBACCO NON-USER: CPT

## 2022-05-06 PROCEDURE — 99213 OFFICE O/P EST LOW 20 MIN: CPT

## 2022-05-06 PROCEDURE — 3008F BODY MASS INDEX DOCD: CPT

## 2022-05-06 PROCEDURE — 3725F SCREEN DEPRESSION PERFORMED: CPT

## 2022-05-06 RX ORDER — VALACYCLOVIR HYDROCHLORIDE 1 G/1
1000 TABLET, FILM COATED ORAL AS NEEDED
Qty: 4 TABLET | Refills: 5 | Status: SHIPPED | OUTPATIENT
Start: 2022-05-06 | End: 2022-05-07

## 2022-05-06 RX ORDER — ACYCLOVIR 50 MG/G
OINTMENT TOPICAL 4 TIMES DAILY
Qty: 15 G | Refills: 0 | Status: SHIPPED | OUTPATIENT
Start: 2022-05-06

## 2022-05-06 NOTE — PATIENT INSTRUCTIONS
Oral Herpes Simplex Virus Infections   WHAT YOU NEED TO KNOW:   Oral herpes simplex virus (HSV) infections cause sores to form on the mouth, lips, or gums  HSV has 2 types  Oral HSV infections are most often caused by HSV type 1  HSV type 2 normally affects the genital area, but may also occur in the mouth  After you are infected, the virus hides in your nerves and may return  An HSV infection that comes back is also known as a cold sore  DISCHARGE INSTRUCTIONS:   Medicines:   · Antiviral medicine:  Valtrex - This decreases symptoms and shortens the amount of time blisters are present  You may also need to take it daily to prevent blisters  The medicine may be given as a liquid, pill, or ointment  Use as directed  · Numbing medicine: Zovirax  This decreases mouth pain  It is usually given as a mouth rinse  Use it before you eat or drink, or as directed  Follow up with your doctor as directed:  Write down your questions so you remember to ask them during your visits  Self-care:   · Eat soft, bland foods:  Avoid salty, acidic, spicy, sharp-edged, and hard foods  Eat healthy foods to help healing  · Drink liquids:  Cool liquids may help soothe your mouth and numb the pain  Avoid citrus or carbonated drinks, such as orange or grapefruit juice, lemonade, or soda  These liquids may cause your mouth to hurt more  A straw may help if you have blisters on the lips or tongue  · Use ice:  Ice helps decrease swelling and pain  Drink cold water or suck on ice to help decrease pain on your tongue or inside your mouth  Use an ice pack, or put crushed ice in a plastic bag on your lip  Cover it with a towel and place it on your lip for 15 to 20 minutes every hour or as directed  Prevent the spread of the herpes simplex virus:   · Do not have close contact with people until the blisters heal  This includes touching, kissing, and oral sex       · Do not get close to babies or to people who are sick while you have cold sores  · Do not share eating utensils, towels, lip balm, or makeup with another person  · Do not touch the blisters or pick at the scabs  Do not touch other body parts, especially your eyes or genitals without washing your hands first  Wash your hands often  Contact your healthcare provider if:   · You have a fever  · Your symptoms become worse or do not improve a week after you start treatment  · You have difficulty eating or drinking because of the pain in your mouth  · You get a headache, are nauseated, or vomit  · Your eyes feel irritated, or you feel like you have something in your eye  · Your skin becomes itchy, swollen, or develops a rash after you take your medicine  · You have questions or concerns about your condition or care  Return to the emergency department if:   · You get a fever, feel achy, or see pus instead of clear fluid in the sores  · You get sores on your eyes  · You have abdominal pain, a severe headache, or confusion  · You get new symptoms, or old symptoms return after you have been treated  © Copyright [employer]. 2022 Information is for End User's use only and may not be sold, redistributed or otherwise used for commercial purposes  All illustrations and images included in CareNotes® are the copyrighted property of [employer]  or Tommy Ji  The above information is an  only  It is not intended as medical advice for individual conditions or treatments  Talk to your doctor, nurse or pharmacist before following any medical regimen to see if it is safe and effective for you

## 2022-05-06 NOTE — PROGRESS NOTES
[facility] - [facility]    NAME: Alejandra Palmer  AGE: 25 y o  SEX: female  : 2000     DATE: 2022     Assessment and Plan:     Diagnoses and all orders for this visit:    Cold sore  -     valACYclovir (VALTREX) 1,000 mg tablet; Take 1 tablet (1,000 mg total) by mouth if needed (cold sore) for up to 2 doses Take 2 pills (2000 mg) now, in 12 hours take 2 pills (2000 mg)  On presentation of symptoms may take 2 pills to prevent outbreak  -     acyclovir (ZOVIRAX) 5 % ointment; Apply topically 4 (four) times a day    Screening breast examination  -     Ambulatory Referral to Gynecology; Future    Encounter for annual routine gynecological examination      No follow-ups on file  History of Present Illness:   Patient presents today with a 48 hour history of a painful erosion to her upper lip  She also reports a swollen, painful lump on her right neck/jaw line  She denies any other symptoms at present  She admits that she gets "cold sores" once in a while  She admits to recent stress and lack of sleep due to studying for her finals  She has been trying OTC Abreva which has been ineffective  Review of Systems:     Review of Systems   HENT: Positive for mouth sores  Objective:     /66 (BP Location: Left arm, Patient Position: Sitting, Cuff Size: Standard) Comment: bp  Pulse 82   Temp 98 9 °F (37 2 °C) (Temporal) Comment: no  Ht 5' 7" (1 702 m)   Wt 91 1 kg (200 lb 12 8 oz)   SpO2 98%   BMI 31 45 kg/m²     Physical Exam  Constitutional:       Appearance: She is well-developed  HENT:      Head: Normocephalic and atraumatic  Eyes:      Pupils: Pupils are equal, round, and reactive to light  Neck:      Thyroid: No thyromegaly  Cardiovascular:      Rate and Rhythm: Normal rate and regular rhythm  Heart sounds: No murmur heard  Pulmonary:      Effort: Pulmonary effort is normal       Breath sounds: Normal breath sounds     Abdominal: General: Bowel sounds are normal       Palpations: Abdomen is soft  Musculoskeletal:         General: Normal range of motion  Cervical back: Normal range of motion and neck supple  Lymphadenopathy:      Cervical: No cervical adenopathy  Skin:     General: Skin is warm and dry  Findings: Lesion present  Comments: Right upper lip   Neurological:      Mental Status: She is alert and oriented to person, place, and time         MAIDA Herrera  [facility]

## 2022-07-05 DIAGNOSIS — Z30.45 ENCOUNTER FOR SURVEILLANCE OF TRANSDERMAL PATCH HORMONAL CONTRACEPTIVE DEVICE: ICD-10-CM

## 2022-07-29 DIAGNOSIS — Z30.45 ENCOUNTER FOR SURVEILLANCE OF TRANSDERMAL PATCH HORMONAL CONTRACEPTIVE DEVICE: ICD-10-CM

## 2022-07-29 NOTE — TELEPHONE ENCOUNTER
Pt called back to scheduled her yearly appointment and I offered her an appointment for September but she refused to wait until September  Pt would like a call back regarding her refill  Please advise! Thanks!

## 2022-08-17 ENCOUNTER — OFFICE VISIT (OUTPATIENT)
Dept: URGENT CARE | Facility: CLINIC | Age: 22
End: 2022-08-17
Payer: COMMERCIAL

## 2022-08-17 VITALS — OXYGEN SATURATION: 99 % | RESPIRATION RATE: 14 BRPM | TEMPERATURE: 98.4 F | HEART RATE: 84 BPM

## 2022-08-17 DIAGNOSIS — J06.9 ACUTE URI: Primary | ICD-10-CM

## 2022-08-17 DIAGNOSIS — R52 GENERALIZED BODY ACHES: ICD-10-CM

## 2022-08-17 LAB
SARS-COV-2 AG UPPER RESP QL IA: NEGATIVE
VALID CONTROL: NORMAL

## 2022-08-17 PROCEDURE — S9088 SERVICES PROVIDED IN URGENT: HCPCS | Performed by: PHYSICIAN ASSISTANT

## 2022-08-17 PROCEDURE — 87811 SARS-COV-2 COVID19 W/OPTIC: CPT | Performed by: PHYSICIAN ASSISTANT

## 2022-08-17 PROCEDURE — 99213 OFFICE O/P EST LOW 20 MIN: CPT | Performed by: PHYSICIAN ASSISTANT

## 2022-08-17 RX ORDER — AZITHROMYCIN 250 MG/1
TABLET, FILM COATED ORAL
Qty: 6 TABLET | Refills: 0 | Status: SHIPPED | OUTPATIENT
Start: 2022-08-17 | End: 2022-08-21
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
## 2022-09-14 NOTE — TELEPHONE ENCOUNTER
[facility]: Post-Discharge Note  SITUATION                                                      Admission:    Admission Date: 09/10/22   Reason for Admission: possible thrombectomy.  Discharge:   Discharge Date: 09/13/22  Discharge Diagnosis: Extensive pulmonary emboli likely related to recent COVID-19 infection:     Recent COVID-19:     HTN   Depression:    BACKGROUND                                                      Per hospital discharge summary and inpatient provider notes:  Joselito Padilla is a 52 year old male transferred from [facility] for possible thrombectomy.  Recent COVID-19 mid August, recovered and traveled on airplane.  Returned and was feeling well until a couple days before presentation to OSH with dyspnea/chest tightness.  Found to have extensive PE on CT and right sided heart changes.  He was placed on IV Heparin.  He was setup for transfer for thrombectomy.  As no beds were available he remained in the [facility] ED for another day.  He transferred to [facility] 9/10.    ASSESSMENT        Discharge Assessment  How are you doing now that you are home?: Very good.  How are your symptoms? (Red Flag symptoms escalate to triage hotline per guidelines): Improved  Do you feel your condition is stable enough to be safe at home until your provider visit?: Yes  Does the patient have their discharge instructions? : Yes  Does the patient have questions regarding their discharge instructions? : No  Were you started on any new medications or were there changes to any of your previous medications? : Yes  Does the patient have all of their medications?: Yes  Do you have questions regarding any of your medications? : No  Do you have all of your needed medical supplies or equipment (DME)?  (i.e. oxygen tank, CPAP, cane, etc.): Yes  Discharge follow-up appointment scheduled within 14 calendar days? : Yes  Discharge Follow Up Appointment Date: 09/21/22  Discharge Follow Up Appointment Scheduled with?: Primary Care  Spoke with pt and clarified lab results  Encouraged use of the antifungal for symptoms   She does not have a UTI Provider (Two appts that day, one with PCP, one with specialist.)    Post-op ([facility] CTA Only)  If the patient had a surgery or procedure, do they have any questions for a nurse?: No      PLAN                                                      Outpatient Plan:     Follow up with primary care provider, Bhaskar Maldonado, within 7 days for hospital follow- up.  The following labs/tests are recommended:  BMP.    Follow up with interventional radiology within 1 month.  with repeat RLE venous US  OK to remove dressing in 72 hours  Likely IVC filter removal in 2 months.   Follow up with vascular medicine         Future Appointments   Date Time Provider Department [place]   9/21/2022  9:30 AM Bhaskar Maldonado MD [facility]   9/21/2022  3:30 PM Brandy Vaughn MD [facility]   10/10/2022  2:45 PM [facility]   10/10/2022  3:40 PM Tim Kyle MD [facility]         For any urgent concerns, please contact our 24 hour nurse triage line: [phone] ([phone])         Yasmine Villa, CTA  [phone]  Connected Care [facility]

## 2022-09-16 DIAGNOSIS — Z30.45 ENCOUNTER FOR SURVEILLANCE OF TRANSDERMAL PATCH HORMONAL CONTRACEPTIVE DEVICE: ICD-10-CM

## 2022-09-16 RX ORDER — NORELGESTROMIN AND ETHINYL ESTRADIOL 150; 35 UG/D; UG/D
PATCH TRANSDERMAL
Qty: 3 PATCH | Refills: 1 | Status: SHIPPED | OUTPATIENT
Start: 2022-09-16

## 2022-10-04 ENCOUNTER — OFFICE VISIT (OUTPATIENT)
Dept: URGENT CARE | Facility: CLINIC | Age: 22
End: 2022-10-04
Payer: COMMERCIAL

## 2022-10-04 VITALS
BODY MASS INDEX: 33.52 KG/M2 | DIASTOLIC BLOOD PRESSURE: 95 MMHG | TEMPERATURE: 97.4 F | WEIGHT: 214 LBS | RESPIRATION RATE: 18 BRPM | OXYGEN SATURATION: 97 % | HEART RATE: 78 BPM | SYSTOLIC BLOOD PRESSURE: 137 MMHG

## 2022-10-04 DIAGNOSIS — R19.7 DIARRHEA, UNSPECIFIED TYPE: ICD-10-CM

## 2022-10-04 DIAGNOSIS — R11.0 NAUSEA: Primary | ICD-10-CM

## 2022-10-04 DIAGNOSIS — N92.6 ABNORMAL MENSES: ICD-10-CM

## 2022-10-04 LAB
SARS-COV-2 AG UPPER RESP QL IA: NEGATIVE
SL AMB POCT URINE HCG: NEGATIVE
VALID CONTROL: NORMAL

## 2022-10-04 PROCEDURE — 87811 SARS-COV-2 COVID19 W/OPTIC: CPT | Performed by: EMERGENCY MEDICINE

## 2022-10-04 PROCEDURE — 99212 OFFICE O/P EST SF 10 MIN: CPT | Performed by: EMERGENCY MEDICINE

## 2022-10-04 PROCEDURE — S9088 SERVICES PROVIDED IN URGENT: HCPCS | Performed by: EMERGENCY MEDICINE

## 2022-10-04 PROCEDURE — 81025 URINE PREGNANCY TEST: CPT | Performed by: EMERGENCY MEDICINE

## 2022-10-04 RX ORDER — ONDANSETRON 4 MG/1
4 TABLET, ORALLY DISINTEGRATING ORAL ONCE
Status: COMPLETED | OUTPATIENT
Start: 2022-10-04 | End: 2022-10-04

## 2022-10-04 RX ORDER — ONDANSETRON 4 MG/1
TABLET, ORALLY DISINTEGRATING ORAL
Qty: 12 TABLET | Refills: 0 | Status: SHIPPED | OUTPATIENT
Start: 2022-10-04

## 2022-10-04 RX ADMIN — ONDANSETRON 4 MG: 4 TABLET, ORALLY DISINTEGRATING ORAL at 10:45
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
## 2022-10-04 NOTE — PATIENT INSTRUCTIONS
BRAT diet for diarrhea, bananas, rice, applesauce, and toast  Encourage liquids and rest  Zofran for nausea  F/u  with PCP in 2-3 days  F/u with gynecologist today as scheduled

## 2022-10-04 NOTE — LETTER
October 4, 2022     Patient: Cyrena Merlin   YOB: 2000   Date of Visit: 10/4/2022       To Whom it May Concern:    Shalom Gambino was seen in my clinic on 10/4/2022  She may return to school on 10/06/2022  If you have any questions or concerns, please don't hesitate to call           Sincerely,          Marianne Vega DO        CC: No Recipients

## 2022-10-22 ENCOUNTER — OFFICE VISIT (OUTPATIENT)
Dept: URGENT CARE | Facility: CLINIC | Age: 22
End: 2022-10-22
Payer: COMMERCIAL

## 2022-10-22 VITALS — HEART RATE: 90 BPM | TEMPERATURE: 99 F | RESPIRATION RATE: 16 BRPM | OXYGEN SATURATION: 98 %

## 2022-10-22 DIAGNOSIS — J02.9 SORE THROAT: Primary | ICD-10-CM

## 2022-10-22 DIAGNOSIS — J02.9 ACUTE PHARYNGITIS, UNSPECIFIED ETIOLOGY: ICD-10-CM

## 2022-10-22 DIAGNOSIS — B00.9 HERPES SIMPLEX: ICD-10-CM

## 2022-10-22 LAB — S PYO AG THROAT QL: NEGATIVE

## 2022-10-22 PROCEDURE — 99213 OFFICE O/P EST LOW 20 MIN: CPT | Performed by: EMERGENCY MEDICINE

## 2022-10-22 PROCEDURE — S9088 SERVICES PROVIDED IN URGENT: HCPCS | Performed by: EMERGENCY MEDICINE

## 2022-10-22 PROCEDURE — 87880 STREP A ASSAY W/OPTIC: CPT | Performed by: EMERGENCY MEDICINE

## 2022-10-22 PROCEDURE — 87070 CULTURE OTHR SPECIMN AEROBIC: CPT | Performed by: EMERGENCY MEDICINE

## 2022-10-22 RX ORDER — ACYCLOVIR 50 MG/G
OINTMENT TOPICAL 4 TIMES DAILY PRN
Qty: 30 G | Refills: 0 | Status: SHIPPED | OUTPATIENT
Start: 2022-10-22 | End: 2022-10-22

## 2022-10-22 RX ORDER — ACYCLOVIR 50 MG/G
OINTMENT TOPICAL 4 TIMES DAILY PRN
Qty: 30 G | Refills: 0 | Status: SHIPPED | OUTPATIENT
Start: 2022-10-22 | End: 2022-10-29

## 2022-10-22 RX ORDER — LIDOCAINE HYDROCHLORIDE 20 MG/ML
15 SOLUTION OROPHARYNGEAL 3 TIMES DAILY PRN
Qty: 150 ML | Refills: 0 | Status: SHIPPED | OUTPATIENT
Start: 2022-10-22

## 2022-10-22 NOTE — PATIENT INSTRUCTIONS
Meds as prescribed  Check MY CHART in 24-72 hrs for throat culture results  Gargle with warm salt water 3-4 x/day  Hot tea/honey  Tylenol/Motrin for pain/fever
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
## 2022-10-23 LAB — BACTERIA THROAT CULT: NORMAL

## 2022-10-24 LAB — BACTERIA THROAT CULT: NORMAL

## 2022-11-22 ENCOUNTER — VBI (OUTPATIENT)
Dept: ADMINISTRATIVE | Facility: OTHER | Age: 22
End: 2022-11-22
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
